# Patient Record
Sex: MALE | Race: WHITE | NOT HISPANIC OR LATINO | Employment: FULL TIME | URBAN - METROPOLITAN AREA
[De-identification: names, ages, dates, MRNs, and addresses within clinical notes are randomized per-mention and may not be internally consistent; named-entity substitution may affect disease eponyms.]

---

## 2023-08-22 ENCOUNTER — APPOINTMENT (OUTPATIENT)
Dept: PRIMARY CARE | Facility: CLINIC | Age: 58
End: 2023-08-22
Payer: COMMERCIAL

## 2023-09-22 ENCOUNTER — OFFICE VISIT (OUTPATIENT)
Dept: PRIMARY CARE | Facility: CLINIC | Age: 58
End: 2023-09-22
Payer: COMMERCIAL

## 2023-09-22 ENCOUNTER — LAB (OUTPATIENT)
Dept: LAB | Facility: LAB | Age: 58
End: 2023-09-22
Payer: COMMERCIAL

## 2023-09-22 VITALS — RESPIRATION RATE: 14 BRPM | SYSTOLIC BLOOD PRESSURE: 129 MMHG | HEART RATE: 68 BPM | DIASTOLIC BLOOD PRESSURE: 79 MMHG

## 2023-09-22 DIAGNOSIS — M25.511 CHRONIC RIGHT SHOULDER PAIN: ICD-10-CM

## 2023-09-22 DIAGNOSIS — E78.00 PURE HYPERCHOLESTEROLEMIA: ICD-10-CM

## 2023-09-22 DIAGNOSIS — R35.1 NOCTURIA: ICD-10-CM

## 2023-09-22 DIAGNOSIS — G89.29 CHRONIC RIGHT SHOULDER PAIN: ICD-10-CM

## 2023-09-22 DIAGNOSIS — T14.8XXA BRUISE: ICD-10-CM

## 2023-09-22 DIAGNOSIS — T14.8XXA BRUISE: Primary | ICD-10-CM

## 2023-09-22 PROBLEM — E78.5 HYPERLIPIDEMIA: Status: ACTIVE | Noted: 2023-09-22

## 2023-09-22 LAB
ALANINE AMINOTRANSFERASE (SGPT) (U/L) IN SER/PLAS: 18 U/L (ref 10–52)
ALBUMIN (G/DL) IN SER/PLAS: 4.8 G/DL (ref 3.4–5)
ALKALINE PHOSPHATASE (U/L) IN SER/PLAS: 125 U/L (ref 33–120)
ANION GAP IN SER/PLAS: 10 MMOL/L (ref 10–20)
ASPARTATE AMINOTRANSFERASE (SGOT) (U/L) IN SER/PLAS: 17 U/L (ref 9–39)
BASOPHILS (10*3/UL) IN BLOOD BY AUTOMATED COUNT: 0.09 X10E9/L (ref 0–0.1)
BASOPHILS/100 LEUKOCYTES IN BLOOD BY AUTOMATED COUNT: 0.7 % (ref 0–2)
BILIRUBIN TOTAL (MG/DL) IN SER/PLAS: 0.7 MG/DL (ref 0–1.2)
CALCIUM (MG/DL) IN SER/PLAS: 9.6 MG/DL (ref 8.6–10.3)
CARBON DIOXIDE, TOTAL (MMOL/L) IN SER/PLAS: 28 MMOL/L (ref 21–32)
CHLORIDE (MMOL/L) IN SER/PLAS: 101 MMOL/L (ref 98–107)
CHOLESTEROL (MG/DL) IN SER/PLAS: 224 MG/DL (ref 0–199)
CHOLESTEROL IN HDL (MG/DL) IN SER/PLAS: 35.7 MG/DL
CHOLESTEROL/HDL RATIO: 6.3
CREATININE (MG/DL) IN SER/PLAS: 1.02 MG/DL (ref 0.5–1.3)
EOSINOPHILS (10*3/UL) IN BLOOD BY AUTOMATED COUNT: 0.64 X10E9/L (ref 0–0.7)
EOSINOPHILS/100 LEUKOCYTES IN BLOOD BY AUTOMATED COUNT: 4.7 % (ref 0–6)
ERYTHROCYTE DISTRIBUTION WIDTH (RATIO) BY AUTOMATED COUNT: 13.6 % (ref 11.5–14.5)
ERYTHROCYTE MEAN CORPUSCULAR HEMOGLOBIN CONCENTRATION (G/DL) BY AUTOMATED: 33.8 G/DL (ref 32–36)
ERYTHROCYTE MEAN CORPUSCULAR VOLUME (FL) BY AUTOMATED COUNT: 88 FL (ref 80–100)
ERYTHROCYTES (10*6/UL) IN BLOOD BY AUTOMATED COUNT: 5.33 X10E12/L (ref 4.5–5.9)
GFR MALE: 86 ML/MIN/1.73M2
GLUCOSE (MG/DL) IN SER/PLAS: 110 MG/DL (ref 74–99)
HEMATOCRIT (%) IN BLOOD BY AUTOMATED COUNT: 47.1 % (ref 41–52)
HEMOGLOBIN (G/DL) IN BLOOD: 15.9 G/DL (ref 13.5–17.5)
IMMATURE GRANULOCYTES/100 LEUKOCYTES IN BLOOD BY AUTOMATED COUNT: 0.4 % (ref 0–0.9)
LDL: 131 MG/DL (ref 0–99)
LEUKOCYTES (10*3/UL) IN BLOOD BY AUTOMATED COUNT: 13.6 X10E9/L (ref 4.4–11.3)
LYMPHOCYTES (10*3/UL) IN BLOOD BY AUTOMATED COUNT: 3.39 X10E9/L (ref 1.2–4.8)
LYMPHOCYTES/100 LEUKOCYTES IN BLOOD BY AUTOMATED COUNT: 24.9 % (ref 13–44)
MONOCYTES (10*3/UL) IN BLOOD BY AUTOMATED COUNT: 0.86 X10E9/L (ref 0.1–1)
MONOCYTES/100 LEUKOCYTES IN BLOOD BY AUTOMATED COUNT: 6.3 % (ref 2–10)
NEUTROPHILS (10*3/UL) IN BLOOD BY AUTOMATED COUNT: 8.56 X10E9/L (ref 1.2–7.7)
NEUTROPHILS/100 LEUKOCYTES IN BLOOD BY AUTOMATED COUNT: 63 % (ref 40–80)
NON HDL CHOLESTEROL: 188 MG/DL
PLATELETS (10*3/UL) IN BLOOD AUTOMATED COUNT: 329 X10E9/L (ref 150–450)
POTASSIUM (MMOL/L) IN SER/PLAS: 4.6 MMOL/L (ref 3.5–5.3)
PROSTATE SPECIFIC AG (NG/ML) IN SER/PLAS: 0.58 NG/ML (ref 0–4)
PROTEIN TOTAL: 7.6 G/DL (ref 6.4–8.2)
SODIUM (MMOL/L) IN SER/PLAS: 134 MMOL/L (ref 136–145)
TRIGLYCERIDE (MG/DL) IN SER/PLAS: 289 MG/DL (ref 0–149)
UREA NITROGEN (MG/DL) IN SER/PLAS: 11 MG/DL (ref 6–23)
VLDL: 58 MG/DL (ref 0–40)

## 2023-09-22 PROCEDURE — 36415 COLL VENOUS BLD VENIPUNCTURE: CPT

## 2023-09-22 PROCEDURE — 85025 COMPLETE CBC W/AUTO DIFF WBC: CPT

## 2023-09-22 PROCEDURE — 80061 LIPID PANEL: CPT

## 2023-09-22 PROCEDURE — 84153 ASSAY OF PSA TOTAL: CPT

## 2023-09-22 PROCEDURE — 80053 COMPREHEN METABOLIC PANEL: CPT

## 2023-09-22 PROCEDURE — 90471 IMMUNIZATION ADMIN: CPT | Performed by: FAMILY MEDICINE

## 2023-09-22 PROCEDURE — 90686 IIV4 VACC NO PRSV 0.5 ML IM: CPT | Performed by: FAMILY MEDICINE

## 2023-09-22 PROCEDURE — 99214 OFFICE O/P EST MOD 30 MIN: CPT | Performed by: FAMILY MEDICINE

## 2023-09-22 RX ORDER — ATORVASTATIN CALCIUM 40 MG/1
1 TABLET, FILM COATED ORAL DAILY
COMMUNITY
Start: 2022-07-13 | End: 2023-09-22 | Stop reason: SDUPTHER

## 2023-09-22 RX ORDER — MELOXICAM 15 MG/1
15 TABLET ORAL DAILY
Qty: 30 TABLET | Refills: 2 | Status: SHIPPED | OUTPATIENT
Start: 2023-09-22 | End: 2024-05-24 | Stop reason: SDUPTHER

## 2023-09-22 RX ORDER — ATORVASTATIN CALCIUM 40 MG/1
40 TABLET, FILM COATED ORAL DAILY
Qty: 90 TABLET | Refills: 3 | Status: SHIPPED | OUTPATIENT
Start: 2023-09-22 | End: 2024-05-24 | Stop reason: SDUPTHER

## 2023-09-22 NOTE — PROGRESS NOTES
Subjective   Patient ID: Josh Garcia is a 58 y.o. male who presents for fu    HPI   Left arm easy bruise for many nos. No nose bleeding. Patient has been compliant with taking statin for high lipids. Normal appetite. No CP, HA, dizziness, heart palpitation. No claudication or cold LE.  + nocturia, no hematuria or dysuria, frequency or urgency. No LE edema. Rt shoulder dull pain was controlled with mobic prn. No ext weakness. No imbalance or falls. Good mood.     Review of Systems    Objective   /79   Pulse 68   Resp 14     Physical Exam  NAD, well groomed, No sclera icterus. No nose bleeding.  no cervical or axillary lymphadenopathy,  lungs: CTA b/l, heart: RRR, No LE edema, normal pedal pulses, abd: soft, no tenderness, BS+, cva percussion was -, mild rt shoulder tenderness with full rom. + left forearm skin bruises,   Good balance. CNII-XII were grossly intact, good judgment and memory. No depressed mood.    Assessment/Plan   Problem List Items Addressed This Visit       Hyperlipidemia     Hyperlipidemia, not on statin.  check labs for evaluation.  Health diet and regular exercise. Decrease calorie intake to lose wt.  f/u in 3 mos.            Relevant Medications    atorvastatin (Lipitor) 40 mg tablet    Other Relevant Orders    Comprehensive Metabolic Panel (Completed)    Lipid Panel (Completed)    Nocturia     Check psa.          Relevant Orders    Prostate Specific Antigen (Completed)    Referral to Urology    Bruise - Primary     Check cbc for eval         Relevant Orders    CBC and Auto Differential (Completed)    Chronic right shoulder pain     Mobic as dir. Caution of gi upset and kidney damage. Stretch exercise         Relevant Medications    meloxicam (Mobic) 15 mg tablet     Patient is not sick today. Patient is not anxious about getting a shot today. Patient has never had Guillain Barré syndrome. Patient has never felt dizzy or faint before, during, or after a shot. Patient has never had a  serious reaction to influenza vaccine in the past.  Patient has never had an allergy to an ingredient of influenza vaccine.  Flu shot was given via IM today.

## 2023-09-22 NOTE — ASSESSMENT & PLAN NOTE
Hyperlipidemia, not on statin.  check labs for evaluation.  Health diet and regular exercise. Decrease calorie intake to lose wt.  f/u in 3 mos.

## 2023-09-26 ENCOUNTER — TELEPHONE (OUTPATIENT)
Dept: PRIMARY CARE | Facility: CLINIC | Age: 58
End: 2023-09-26
Payer: COMMERCIAL

## 2023-09-26 NOTE — TELEPHONE ENCOUNTER
He called he would like a call back to talk about his last office visit with you please call him at [phone]

## 2023-12-22 ENCOUNTER — APPOINTMENT (OUTPATIENT)
Dept: PRIMARY CARE | Facility: CLINIC | Age: 58
End: 2023-12-22
Payer: COMMERCIAL

## 2024-05-24 ENCOUNTER — OFFICE VISIT (OUTPATIENT)
Dept: PRIMARY CARE | Facility: CLINIC | Age: 59
End: 2024-05-24
Payer: COMMERCIAL

## 2024-05-24 VITALS — SYSTOLIC BLOOD PRESSURE: 129 MMHG | DIASTOLIC BLOOD PRESSURE: 68 MMHG

## 2024-05-24 DIAGNOSIS — L98.9 SKIN LESION OF FACE: Primary | ICD-10-CM

## 2024-05-24 DIAGNOSIS — M25.511 CHRONIC RIGHT SHOULDER PAIN: ICD-10-CM

## 2024-05-24 DIAGNOSIS — E78.00 PURE HYPERCHOLESTEROLEMIA: ICD-10-CM

## 2024-05-24 DIAGNOSIS — G89.29 CHRONIC RIGHT SHOULDER PAIN: ICD-10-CM

## 2024-05-24 PROCEDURE — 11104 PUNCH BX SKIN SINGLE LESION: CPT | Performed by: FAMILY MEDICINE

## 2024-05-24 PROCEDURE — 99214 OFFICE O/P EST MOD 30 MIN: CPT | Performed by: FAMILY MEDICINE

## 2024-05-24 PROCEDURE — 88305 TISSUE EXAM BY PATHOLOGIST: CPT

## 2024-05-24 RX ORDER — ATORVASTATIN CALCIUM 40 MG/1
40 TABLET, FILM COATED ORAL DAILY
Qty: 90 TABLET | Refills: 3 | Status: SHIPPED | OUTPATIENT
Start: 2024-05-24

## 2024-05-24 RX ORDER — MELOXICAM 15 MG/1
15 TABLET ORAL DAILY
Qty: 30 TABLET | Refills: 2 | Status: SHIPPED | OUTPATIENT
Start: 2024-05-24 | End: 2025-05-24

## 2024-05-24 NOTE — ASSESSMENT & PLAN NOTE
Hyperlipidemia on statin. No GI upset or muscle ache. Will monitor labs for evaluation.  Health diet and regular exercise. Decrease calorie intake to lose wt.  Rtc for cpe

## 2024-05-24 NOTE — PROGRESS NOTES
Subjective   Patient ID: Josh Garcia is a 58 y.o. male who presents for fu    HPI   Patient has been compliant with taking all  current medications. No GI upset  or muscle ache while on statin for high lipids. Normal appetite. No CP, HA, dizziness, heart palpitation. No claudication or cold LE.  No LE edema. Rt shoulder pain was controlled with mobic prn. pt has had a skin lesion at left face  for at least 1/2 year. the lesion  got bigger  recently. pt felt occasional tenderness at the lesion. pt was concerned  and requested evaluation.      Review of Systems    Objective   /68     Physical Exam  No acute distress. no cervical or axillary lymphadenopathy,  Lungs: CTA b/l, Heart: RRR, normal pedal pulses, Abdomen: soft, non tenderness. Bowel sound: normal. there was a 0.4x0.4 cm dskin lesion at left face,  no tenderness or bleeding, Patient walks with a good balance.     Assessment/Plan   Problem List Items Addressed This Visit             ICD-10-CM    Hyperlipidemia E78.5     Hyperlipidemia on statin. No GI upset or muscle ache. Will monitor labs for evaluation.  Health diet and regular exercise. Decrease calorie intake to lose wt.  Rtc for cpe           Relevant Medications    atorvastatin (Lipitor) 40 mg tablet    Chronic right shoulder pain M25.511, G89.29     Controlled.  Cont  cold compress, stretch exercise.  Cont  mobic  as dir. caution of SE from mobic such as kidney damage and stomach bleeding. take mobic with foods.          Relevant Medications    meloxicam (Mobic) 15 mg tablet    Skin lesion of face - Primary L98.9     Recommend biopsy for evaluation. Risks of biopsy such as nonhealing wound, infection, scar, numbness etc. were discussed with patient, who understood well. Pt agreed to the procedure. Under sterile condition with betadine and 1 ml 1% lidocaine anesthesia, the lesion  was biopsied via 3mm punch and sent to pathology. The wound was covered with sterile gauze. Advise pt to keep  area clean and dry. We will notify the pt of the pathology result of the skin biopsy.           Relevant Orders    Dermatopathology- [facility]

## 2024-05-24 NOTE — ASSESSMENT & PLAN NOTE
Controlled.  Cont  cold compress, stretch exercise.  Cont  mobic  as dir. caution of SE from mobic such as kidney damage and stomach bleeding. take mobic with foods.

## 2024-05-24 NOTE — ASSESSMENT & PLAN NOTE
Recommend biopsy for evaluation. Risks of biopsy such as nonhealing wound, infection, scar, numbness etc. were discussed with patient, who understood well. Pt agreed to the procedure. Under sterile condition with betadine and 1 ml 1% lidocaine anesthesia, the lesion  was biopsied via 3mm punch and sent to pathology. The wound was covered with sterile gauze. Advise pt to keep area clean and dry. We will notify the pt of the pathology result of the skin biopsy.

## 2024-05-29 LAB
LABORATORY COMMENT REPORT: NORMAL
PATH REPORT.FINAL DX SPEC: NORMAL
PATH REPORT.GROSS SPEC: NORMAL
PATH REPORT.MICROSCOPIC SPEC OTHER STN: NORMAL
PATH REPORT.RELEVANT HX SPEC: NORMAL
PATH REPORT.TOTAL CANCER: NORMAL

## 2024-09-20 ENCOUNTER — APPOINTMENT (OUTPATIENT)
Dept: PRIMARY CARE | Facility: CLINIC | Age: 59
End: 2024-09-20
Payer: COMMERCIAL

## 2024-09-20 DIAGNOSIS — D22.4 ATYPICAL MOLE OF NECK: Primary | ICD-10-CM

## 2024-09-20 PROCEDURE — 99213 OFFICE O/P EST LOW 20 MIN: CPT | Performed by: FAMILY MEDICINE

## 2024-09-20 PROCEDURE — 11421 EXC H-F-NK-SP B9+MARG 0.6-1: CPT | Performed by: FAMILY MEDICINE

## 2024-09-20 RX ORDER — CEPHALEXIN 500 MG/1
500 CAPSULE ORAL 3 TIMES DAILY
Qty: 21 CAPSULE | Refills: 0 | Status: SHIPPED | OUTPATIENT
Start: 2024-09-20 | End: 2024-09-27

## 2024-09-20 NOTE — PROGRESS NOTES
Subjective   Patient ID: Josh Garcia is a 59 y.o. male who presents for left neck mole    HPI   left neck mole for many years. It bled some days with occ tenderness. No wt loss, cough or neck gland enlargement or tenderness. Pt requested removal  Review of Systems    Objective   There were no vitals taken for this visit.    Physical Exam  No acute distress. no cervical or axillary lymphadenopathy,  Lungs: CTA b/l, Heart: RRR, Abdomen: soft, non tenderness. Bowel sound: normal. there was a 0.4x0.8 cm brown mole at left neck  with mild tenderness but no  bleeding,   Assessment/Plan     atypical mole, recommend excision. Under sterile condition and with 2 ml 1% lidocaine anesthesia, the mole was excised  0.9x2.2   cm 2ith mm margin and sent to pathology for evaluation. The wound was closed with 4.0 nylon suture and covered with sterile gauze. No water exposure to the wound in the next 24 hours. We will call pt regarding pathology  report. Keflex 500mg po tid  for 7 days for infection prophylaxis. Estimated blood loss: 5cc. Follow up in  14 days for suture removal.

## 2024-10-04 ENCOUNTER — APPOINTMENT (OUTPATIENT)
Dept: PRIMARY CARE | Facility: CLINIC | Age: 59
End: 2024-10-04
Payer: COMMERCIAL

## 2024-10-04 VITALS — DIASTOLIC BLOOD PRESSURE: 76 MMHG | SYSTOLIC BLOOD PRESSURE: 125 MMHG

## 2024-10-04 DIAGNOSIS — M25.511 CHRONIC RIGHT SHOULDER PAIN: Primary | ICD-10-CM

## 2024-10-04 DIAGNOSIS — Z48.02 VISIT FOR SUTURE REMOVAL: ICD-10-CM

## 2024-10-04 DIAGNOSIS — G89.29 CHRONIC RIGHT SHOULDER PAIN: Primary | ICD-10-CM

## 2024-10-04 PROCEDURE — 99213 OFFICE O/P EST LOW 20 MIN: CPT | Performed by: FAMILY MEDICINE

## 2024-10-04 RX ORDER — MELOXICAM 15 MG/1
15 TABLET ORAL DAILY
Qty: 30 TABLET | Refills: 2 | Status: SHIPPED | OUTPATIENT
Start: 2024-10-04 | End: 2025-10-04

## 2024-10-04 NOTE — ASSESSMENT & PLAN NOTE
Controlled. Cont cold compress, stretch exercise.  Cont  mobic  as dir. caution of SE from mobic such as kidney damage and stomach bleeding. take mobic with foods.  Orders:    meloxicam (Mobic) 15 mg tablet; Take 1 tablet (15 mg) by mouth once daily.

## 2024-10-04 NOTE — PROGRESS NOTES
Subjective   Patient ID: Josh Garcia is a 59 y.o. male who presents for fu    HPI   Chronic rt shoulder dull pain was controlled with mobic prn. No LE edema or  GI upset. No arm weakness. Full rom at rt shoulder. No tenderness or oozing.   Review of Systems    Objective   /76     Physical Exam  No distress, well groomed,  mild rt shoulder tenderness, full rom at rt shoulder. normal strength and sensation at rt arm, good balance. The left neck wound healed well.   Assessment/Plan   Assessment & Plan  Chronic right shoulder pain  Controlled. Cont cold compress, stretch exercise.  Cont  mobic  as dir. caution of SE from mobic such as kidney damage and stomach bleeding. take mobic with foods.  Orders:    meloxicam (Mobic) 15 mg tablet; Take 1 tablet (15 mg) by mouth once daily.    Visit for suture removal  The sutures were removed. Advise to keep area dry and clean.

## 2025-04-25 ENCOUNTER — APPOINTMENT (OUTPATIENT)
Dept: PRIMARY CARE | Facility: CLINIC | Age: 60
End: 2025-04-25
Payer: COMMERCIAL

## 2025-04-25 VITALS — SYSTOLIC BLOOD PRESSURE: 132 MMHG | DIASTOLIC BLOOD PRESSURE: 77 MMHG

## 2025-04-25 DIAGNOSIS — M25.551 RIGHT HIP PAIN: Primary | ICD-10-CM

## 2025-04-25 DIAGNOSIS — G89.29 CHRONIC RIGHT SHOULDER PAIN: ICD-10-CM

## 2025-04-25 DIAGNOSIS — E78.00 PURE HYPERCHOLESTEROLEMIA: ICD-10-CM

## 2025-04-25 DIAGNOSIS — M25.511 CHRONIC RIGHT SHOULDER PAIN: ICD-10-CM

## 2025-04-25 DIAGNOSIS — F17.210: ICD-10-CM

## 2025-04-25 PROCEDURE — 99214 OFFICE O/P EST MOD 30 MIN: CPT | Performed by: FAMILY MEDICINE

## 2025-04-25 RX ORDER — MELOXICAM 15 MG/1
15 TABLET ORAL DAILY
Qty: 30 TABLET | Refills: 2 | Status: SHIPPED | OUTPATIENT
Start: 2025-04-25 | End: 2026-04-25

## 2025-04-25 RX ORDER — ATORVASTATIN CALCIUM 40 MG/1
40 TABLET, FILM COATED ORAL DAILY
Qty: 90 TABLET | Refills: 3 | Status: SHIPPED | OUTPATIENT
Start: 2025-04-25

## 2025-04-25 ASSESSMENT — PATIENT HEALTH QUESTIONNAIRE - PHQ9
1. LITTLE INTEREST OR PLEASURE IN DOING THINGS: NOT AT ALL
2. FEELING DOWN, DEPRESSED OR HOPELESS: NOT AT ALL
SUM OF ALL RESPONSES TO PHQ9 QUESTIONS 1 AND 2: 0

## 2025-04-25 NOTE — PROGRESS NOTES
Subjective   Patient ID: Josh Garcia is a 59 y.o. male who presents for fu    HPI   Patient has been compliant with taking all  current medications. No GI upset  or muscle ache while on statin for high lipids and mobic for pain. Normal appetite. No CP, HA, dizziness, heart palpitation. Rt shoulder ull pain was controlled. With mobic. Rt hip pain fro many years got worse at 7/10 lately. No cough, sob. Good mood.     Review of Systems    Objective   /77     Physical Exam  No distress, well groomed, No sclera icterus. lungs: CTA b/l, heart: RRR, no LE  edema, normal pedal pulses, abd: soft, no tenderness, BS+, mild rt shoulder and rt hip tenderness, full rom at rt shoulder and hip,  normal strength and sensation at low extremities, good balance  Assessment/Plan   Assessment & Plan  Right hip pain  Worse lately. Cont mobic as dir.  Orders:    XR hip right with pelvis when performed 2 or 3 views; Future    CBC; Future    Comprehensive Metabolic Panel; Future    Lipid Panel; Future    Pure hypercholesterolemia  Hyperlipidemia on statin. No GI upset or muscle ache. Will monitor labs for evaluation.  Health diet and regular exercise. Decrease calorie intake to lose wt.  f/u in 6 mos.     Orders:    atorvastatin (Lipitor) 40 mg tablet; Take 1 tablet (40 mg) by mouth once daily.    CBC; Future    Comprehensive Metabolic Panel; Future    Lipid Panel; Future    Chronic right shoulder pain  Stable and controlled. Cont mobic  Orders:    meloxicam (Mobic) 15 mg tablet; Take 1 tablet (15 mg) by mouth once daily.    CBC; Future    Comprehensive Metabolic Panel; Future    Lipid Panel; Future    Smokes 20 cigarettes per day or less  Dc smoking. Pt declined ldct.

## 2025-04-25 NOTE — ASSESSMENT & PLAN NOTE
Worse lately. Cont mobic as dir.  Orders:    XR hip right with pelvis when performed 2 or 3 views; Future    CBC; Future    Comprehensive Metabolic Panel; Future    Lipid Panel; Future

## 2025-04-25 NOTE — ASSESSMENT & PLAN NOTE
Stable and controlled. Cont mobic  Orders:    meloxicam (Mobic) 15 mg tablet; Take 1 tablet (15 mg) by mouth once daily.    CBC; Future    Comprehensive Metabolic Panel; Future    Lipid Panel; Future

## 2025-04-30 ENCOUNTER — HOSPITAL ENCOUNTER (OUTPATIENT)
Dept: RADIOLOGY | Facility: CLINIC | Age: 60
Discharge: HOME | End: 2025-04-30
Payer: COMMERCIAL

## 2025-04-30 DIAGNOSIS — M25.551 RIGHT HIP PAIN: ICD-10-CM

## 2025-04-30 PROCEDURE — 73502 X-RAY EXAM HIP UNI 2-3 VIEWS: CPT | Mod: RT

## 2025-05-01 DIAGNOSIS — M25.551 RIGHT HIP PAIN: Primary | ICD-10-CM

## 2025-05-01 LAB
ALBUMIN SERPL-MCNC: 4.5 G/DL (ref 3.6–5.1)
ALP SERPL-CCNC: 136 U/L (ref 35–144)
ALT SERPL-CCNC: 19 U/L (ref 9–46)
ANION GAP SERPL CALCULATED.4IONS-SCNC: 10 MMOL/L (CALC) (ref 7–17)
AST SERPL-CCNC: 17 U/L (ref 10–35)
BILIRUB SERPL-MCNC: 0.8 MG/DL (ref 0.2–1.2)
BUN SERPL-MCNC: 14 MG/DL (ref 7–25)
CALCIUM SERPL-MCNC: 10 MG/DL (ref 8.6–10.3)
CHLORIDE SERPL-SCNC: 104 MMOL/L (ref 98–110)
CHOLEST SERPL-MCNC: 192 MG/DL
CHOLEST/HDLC SERPL: 5.2 (CALC)
CO2 SERPL-SCNC: 24 MMOL/L (ref 20–32)
CREAT SERPL-MCNC: 0.96 MG/DL (ref 0.7–1.3)
EGFRCR SERPLBLD CKD-EPI 2021: 92 ML/MIN/1.73M2
ERYTHROCYTE [DISTWIDTH] IN BLOOD BY AUTOMATED COUNT: 13.3 % (ref 11–15)
GLUCOSE SERPL-MCNC: 111 MG/DL (ref 65–99)
HCT VFR BLD AUTO: 47.6 % (ref 38.5–50)
HDLC SERPL-MCNC: 37 MG/DL
HGB BLD-MCNC: 16.1 G/DL (ref 13.2–17.1)
LDLC SERPL CALC-MCNC: 114 MG/DL (CALC)
MCH RBC QN AUTO: 30.8 PG (ref 27–33)
MCHC RBC AUTO-ENTMCNC: 33.8 G/DL (ref 32–36)
MCV RBC AUTO: 91.2 FL (ref 80–100)
NONHDLC SERPL-MCNC: 155 MG/DL (CALC)
PLATELET # BLD AUTO: 344 THOUSAND/UL (ref 140–400)
PMV BLD REES-ECKER: 10.1 FL (ref 7.5–12.5)
POTASSIUM SERPL-SCNC: 4.7 MMOL/L (ref 3.5–5.3)
PROT SERPL-MCNC: 7.8 G/DL (ref 6.1–8.1)
RBC # BLD AUTO: 5.22 MILLION/UL (ref 4.2–5.8)
SODIUM SERPL-SCNC: 138 MMOL/L (ref 135–146)
TRIGL SERPL-MCNC: 305 MG/DL
WBC # BLD AUTO: 10.9 THOUSAND/UL (ref 3.8–10.8)

## 2025-05-06 ENCOUNTER — TELEPHONE (OUTPATIENT)
Dept: PRIMARY CARE | Facility: CLINIC | Age: 60
End: 2025-05-06
Payer: COMMERCIAL

## 2025-10-24 ENCOUNTER — APPOINTMENT (OUTPATIENT)
Dept: PRIMARY CARE | Facility: CLINIC | Age: 60
End: 2025-10-24
Payer: COMMERCIAL